# Patient Record
(demographics unavailable — no encounter records)

---

## 2025-07-18 NOTE — PLAN
[FreeTextEntry1] : Pap Mammo Discussed colonoscopy Discussed treatment for hot flashes Will think about it.

## 2025-07-18 NOTE — HISTORY OF PRESENT ILLNESS
[Y] : Positive pregnancy history [Currently Active] : currently active [Men] : men [Vaginal] : vaginal [Mammogramdate] : 2024 [PapSmeardate] : 2024 [BoneDensityDate] : 12/24 [LMPDate] : 04/15/25 [PGHxTotal] : 3 [[facility]] : 3 [[facility] Center[facility]] : 3 [FreeTextEntry1] : x3 nsvds